# Patient Record
Sex: MALE | HISPANIC OR LATINO | Employment: UNEMPLOYED | URBAN - METROPOLITAN AREA
[De-identification: names, ages, dates, MRNs, and addresses within clinical notes are randomized per-mention and may not be internally consistent; named-entity substitution may affect disease eponyms.]

---

## 2018-03-21 ENCOUNTER — HOSPITAL ENCOUNTER (EMERGENCY)
Facility: MEDICAL CENTER | Age: 28
End: 2018-03-21
Payer: MEDICAID

## 2018-03-21 VITALS
DIASTOLIC BLOOD PRESSURE: 90 MMHG | SYSTOLIC BLOOD PRESSURE: 137 MMHG | TEMPERATURE: 97.7 F | HEIGHT: 73 IN | OXYGEN SATURATION: 100 % | BODY MASS INDEX: 31 KG/M2 | HEART RATE: 92 BPM | RESPIRATION RATE: 16 BRPM | WEIGHT: 233.91 LBS

## 2018-03-21 PROCEDURE — 302449 STATCHG TRIAGE ONLY (STATISTIC)

## 2018-03-21 ASSESSMENT — PAIN SCALES - GENERAL: PAINLEVEL_OUTOF10: 0

## 2018-03-21 NOTE — ED NOTES
Pt left with out being seen by ERP. AMA form signed on free will, pt asked to leave due to prior engagements and obligations to take mother to an appointment

## 2018-03-21 NOTE — ED TRIAGE NOTES
"Chucho Romero  27 y.o. male  Chief Complaint   Patient presents with   • Eye Swelling     Pt. reports swelling in both eyes bilaterally. No noticeable swelling noted in triage. Pt's eyes are red in triage. Pt. does report smoking marajuana. Pt. states he thinks his pores are clogged.      /90   Pulse 92   Temp 36.5 °C (97.7 °F)   Resp 16   Ht 1.854 m (6' 1\")   Wt 106.1 kg (233 lb 14.5 oz)   SpO2 100%   BMI 30.86 kg/m²   Pt amb to triage with steady gait for above complaint. Pt. Is PERRLA.  Pt is alert and oriented, speaking in full sentences, follows commands and responds appropriately to questions. NAD. Resp are even and unlabored.  Pt placed in lobby. Pt educated on triage process. Pt encouraged to alert staff for any changes.  "